# Patient Record
Sex: FEMALE | URBAN - METROPOLITAN AREA
[De-identification: names, ages, dates, MRNs, and addresses within clinical notes are randomized per-mention and may not be internally consistent; named-entity substitution may affect disease eponyms.]

---

## 2023-07-25 ENCOUNTER — TELEPHONE (OUTPATIENT)
Dept: SURGERY | Facility: CLINIC | Age: 78
End: 2023-07-25

## 2023-07-25 NOTE — TELEPHONE ENCOUNTER
Called patient for appointment. Number is no longer in service. Letter drafted and placed in mail to contact our office. Dr. Cummings notified.

## 2023-07-25 NOTE — TELEPHONE ENCOUNTER
Attempted to contact the patient the number on file Is no longer in service.   ----- Message from Nella Chavez RN sent at 7/25/2023  9:04 AM CDT -----  Regarding: FW: Clinic appt  Please call this patient and see if she would like to come on 8/1 with Dr. Murphy.  ----- Message -----  From: Gale Cummings MD  Sent: 7/23/2023   4:23 PM CDT  To: [facility] Staff  Subject: Clinic appt                                      Can we call this patient Monday and try to get her in to clinic sometime in the next 1.5 weeks for a new colon cancer? She can see anyone, has a symptomatic tumor.

## 2023-07-25 NOTE — TELEPHONE ENCOUNTER
----- Message from Gale Cummings MD sent at 7/23/2023  4:23 PM CDT -----  Regarding: Clinic appt  Can we call this patient Monday and try to get her in to clinic sometime in the next 1.5 weeks for a new colon cancer? She can see anyone, has a symptomatic tumor.